# Patient Record
Sex: MALE | Race: OTHER | NOT HISPANIC OR LATINO
[De-identification: names, ages, dates, MRNs, and addresses within clinical notes are randomized per-mention and may not be internally consistent; named-entity substitution may affect disease eponyms.]

---

## 2021-08-06 ENCOUNTER — RESULT REVIEW (OUTPATIENT)
Age: 86
End: 2021-08-06

## 2021-08-06 ENCOUNTER — OUTPATIENT (OUTPATIENT)
Dept: OUTPATIENT SERVICES | Facility: HOSPITAL | Age: 86
LOS: 1 days | End: 2021-08-06
Payer: MEDICARE

## 2021-08-06 VITALS
WEIGHT: 210.1 LBS | RESPIRATION RATE: 18 BRPM | DIASTOLIC BLOOD PRESSURE: 55 MMHG | SYSTOLIC BLOOD PRESSURE: 155 MMHG | HEART RATE: 73 BPM | TEMPERATURE: 98 F | OXYGEN SATURATION: 97 % | HEIGHT: 66 IN

## 2021-08-06 VITALS
HEART RATE: 69 BPM | SYSTOLIC BLOOD PRESSURE: 106 MMHG | OXYGEN SATURATION: 94 % | DIASTOLIC BLOOD PRESSURE: 61 MMHG | RESPIRATION RATE: 15 BRPM

## 2021-08-06 DIAGNOSIS — R91.8 OTHER NONSPECIFIC ABNORMAL FINDING OF LUNG FIELD: ICD-10-CM

## 2021-08-06 PROBLEM — Z00.00 ENCOUNTER FOR PREVENTIVE HEALTH EXAMINATION: Status: ACTIVE | Noted: 2021-08-06

## 2021-08-06 PROCEDURE — 87184 SC STD DISK METHOD PER PLATE: CPT | Mod: XU

## 2021-08-06 PROCEDURE — 88305 TISSUE EXAM BY PATHOLOGIST: CPT | Mod: 26

## 2021-08-06 PROCEDURE — 87116 MYCOBACTERIA CULTURE: CPT

## 2021-08-06 PROCEDURE — 88305 TISSUE EXAM BY PATHOLOGIST: CPT

## 2021-08-06 PROCEDURE — 87077 CULTURE AEROBIC IDENTIFY: CPT

## 2021-08-06 PROCEDURE — 87102 FUNGUS ISOLATION CULTURE: CPT

## 2021-08-06 PROCEDURE — 87206 SMEAR FLUORESCENT/ACID STAI: CPT

## 2021-08-06 PROCEDURE — 88112 CYTOPATH CELL ENHANCE TECH: CPT | Mod: 26

## 2021-08-06 PROCEDURE — 87181 SC STD AGAR DILUTION PER AGT: CPT

## 2021-08-06 PROCEDURE — 88112 CYTOPATH CELL ENHANCE TECH: CPT

## 2021-08-06 PROCEDURE — 31623 DX BRONCHOSCOPE/BRUSH: CPT

## 2021-08-06 PROCEDURE — 87015 SPECIMEN INFECT AGNT CONCNTJ: CPT

## 2021-08-06 PROCEDURE — 31624 DX BRONCHOSCOPE/LAVAGE: CPT

## 2021-08-06 PROCEDURE — 87070 CULTURE OTHR SPECIMN AEROBIC: CPT

## 2021-08-06 RX ORDER — TAMSULOSIN HYDROCHLORIDE 0.4 MG/1
1 CAPSULE ORAL
Qty: 0 | Refills: 0 | DISCHARGE

## 2021-08-06 RX ORDER — FUROSEMIDE 40 MG
1 TABLET ORAL
Qty: 0 | Refills: 0 | DISCHARGE

## 2021-08-06 RX ORDER — POTASSIUM CHLORIDE 20 MEQ
1 PACKET (EA) ORAL
Qty: 0 | Refills: 0 | DISCHARGE

## 2021-08-06 RX ORDER — SODIUM CHLORIDE 9 MG/ML
500 INJECTION INTRAMUSCULAR; INTRAVENOUS; SUBCUTANEOUS
Refills: 0 | Status: DISCONTINUED | OUTPATIENT
Start: 2021-08-06 | End: 2021-08-20

## 2021-08-06 RX ORDER — FLUTICASONE FUROATE, UMECLIDINIUM BROMIDE AND VILANTEROL TRIFENATATE 200; 62.5; 25 UG/1; UG/1; UG/1
1 POWDER RESPIRATORY (INHALATION)
Qty: 0 | Refills: 0 | DISCHARGE

## 2021-08-06 RX ORDER — PANTOPRAZOLE SODIUM 20 MG/1
1 TABLET, DELAYED RELEASE ORAL
Qty: 0 | Refills: 0 | DISCHARGE

## 2021-08-06 RX ORDER — ESCITALOPRAM OXALATE 10 MG/1
1 TABLET, FILM COATED ORAL
Qty: 0 | Refills: 0 | DISCHARGE

## 2021-08-06 RX ORDER — PITAVASTATIN CALCIUM 1.04 MG/1
1 TABLET, FILM COATED ORAL
Qty: 0 | Refills: 0 | DISCHARGE

## 2021-08-06 RX ORDER — VALSARTAN 80 MG/1
1 TABLET ORAL
Qty: 0 | Refills: 0 | DISCHARGE

## 2021-08-06 RX ORDER — CARVEDILOL PHOSPHATE 80 MG/1
1 CAPSULE, EXTENDED RELEASE ORAL
Qty: 0 | Refills: 0 | DISCHARGE

## 2021-08-06 RX ORDER — LEVOTHYROXINE SODIUM 125 MCG
1 TABLET ORAL
Qty: 0 | Refills: 0 | DISCHARGE

## 2021-08-06 RX ORDER — CLOPIDOGREL BISULFATE 75 MG/1
1 TABLET, FILM COATED ORAL
Qty: 0 | Refills: 0 | DISCHARGE

## 2021-08-06 RX ORDER — RIVAROXABAN 15 MG-20MG
1 KIT ORAL
Qty: 0 | Refills: 0 | DISCHARGE

## 2021-08-06 RX ADMIN — SODIUM CHLORIDE 30 MILLILITER(S): 9 INJECTION INTRAMUSCULAR; INTRAVENOUS; SUBCUTANEOUS at 10:26

## 2021-08-06 NOTE — PRE PROCEDURE NOTE - PRE PROCEDURE EVALUATION
Attending Physician: Kennedy Peraza                           Procedure: Bronchoscopy    Indication for Procedure: Hemoptysis - r/o endobronchial lesion  ________________________________________________________  PAST MEDICAL & SURGICAL HISTORY:  MI (myocardial infarction)    Sleep apnea    CHF (congestive heart failure)    BPH (benign prostatic hyperplasia)    Hypothyroid    Atrial fibrillation    Aortic valve replaced      ALLERGIES:  penicillin (Anaphylaxis)  sulfamethoxazole (Anaphylaxis)    HOME MEDICATIONS:  carvedilol 6.25 mg oral tablet: 1 tab(s) orally 2 times a day  clopidogrel 75 mg oral tablet: 1 tab(s) orally once a day  escitalopram 20 mg oral tablet: 1 tab(s) orally once a day  Flomax 0.4 mg oral capsule: 1 cap(s) orally once a day  furosemide 40 mg oral tablet: 1 tab(s) orally once a day  levothyroxine 75 mcg (0.075 mg) oral tablet: 1 tab(s) orally once a day  Livalo 4 mg oral tablet: 1 tab(s) orally once a day  potassium chloride 10 mEq oral capsule, extended release: 1 cap(s) orally 2 times a day  Protonix 40 mg oral delayed release tablet: 1 tab(s) orally once a day  Trelegy Ellipta 100 mcg-62.5 mcg-25 mcg/inh inhalation powder: 1 puff(s) inhaled once a day  valsartan 40 mg oral tablet: 1 tab(s) orally 2 times a day  Xarelto 10 mg oral tablet: 1 tab(s) orally once a day    AICD/PPM: [x] yes   [ ] no    PERTINENT LAB DATA:    see attached office note    PHYSICAL EXAMINATION:    Height (cm): 167.6  Weight (kg): 95.3  BMI (kg/m2): 33.9  BSA (m2): 2.04T(C): 36.4  HR: 73  BP: 155/55  RR: 18  SpO2: 97%    Constitutional: NAD    Neck:  No JVD  Respiratory: CTAB/L  Cardiovascular: S1 and S2  Gastrointestinal: BS+, soft, NT/ND  Extremities: No peripheral edema  Neurological: A/O x 3, no focal deficits    COMMENTS:    The patient is a suitable candidate for the planned procedure.

## 2021-08-06 NOTE — PRE-ANESTHESIA EVALUATION ADULT - NSANTHAIRWAYFT_ENT_ALL_CORE
FROM  Micrognathia  Short/thick neck  Upper/lower complete dentures FROM  Micrognathia  Short/thick neck  Upper/lower complete dentures  Large tongue

## 2021-08-06 NOTE — ASU PATIENT PROFILE, ADULT - PMH
Aortic valve replaced    Atrial fibrillation    BPH (benign prostatic hyperplasia)    CHF (congestive heart failure)    Hypothyroid    MI (myocardial infarction)    Sleep apnea

## 2021-08-06 NOTE — PRE-ANESTHESIA EVALUATION ADULT - NSRADCARDRESULTSFT_GEN_ALL_CORE
EKG 8/3/21: NSR, RBBB, LAFB    AICD interrogation 6/17/21: DDD, battery life 7.1 years, [employer]    TTE 3/18/21: EF 46%, mild LAD, mildly hypertrophied interventricular septum, LV global motion mildly hypokinetic, RV normal size and motion, Dual chamber pacemaker in the right heart, bioprosthetic Aortic valve, mild AR, biosprosthetic mitral valve, trace MR, mild TR, no pulmonary HTN, no pericardial effusion.

## 2021-08-06 NOTE — PRE-ANESTHESIA EVALUATION ADULT - NSANTHPMHFT_GEN_ALL_CORE
CAD  MI x 3: PCI with stents- about 5-6 stents, placed around 3 years ago  Afib  Cardiomyopathy  CHF: EF 40%  COPD  CASSANDRA: CPAP  BPH  Hypothyroidism    PSH: AVR (2011)  MVR  PCI with stents: 1998, 2011  AICD

## 2021-08-06 NOTE — PROGRESS NOTE ADULT - SUBJECTIVE AND OBJECTIVE BOX
Anesthesiology Event Note:    When consenting the patient for administration of Anesthesia, it was explained to him the risks and adverse events that were possible including a sore throat and nausea/vomiting. It was also explained to him that with his history of multiple previous MI's, CAD with stents, and atrial fibrillation, CASSANDRA, and his previous smoking history that he was at an increased risk of MI and CVA from this procedure. It was also explained to him that in general, he was at an increased risk of MI and CVA. The patient seemed surprised by this and stated that he had never been told this by any of his previous physicians. It was explained to him that with his multiple prior surgeries and his risk factors, that his physicians and care team should have at least told him of the increased risk he suffered of developing an MI or CVA.

## 2021-08-07 LAB
GRAM STN FLD: SIGNIFICANT CHANGE UP
NIGHT BLUE STAIN TISS: SIGNIFICANT CHANGE UP
SPECIMEN SOURCE: SIGNIFICANT CHANGE UP
SPECIMEN SOURCE: SIGNIFICANT CHANGE UP

## 2021-08-09 LAB
-  CEFTRIAXONE: SIGNIFICANT CHANGE UP
-  CLINDAMYCIN: SIGNIFICANT CHANGE UP
-  ERYTHROMYCIN: SIGNIFICANT CHANGE UP
-  LEVOFLOXACIN: SIGNIFICANT CHANGE UP
-  PENICILLIN: SIGNIFICANT CHANGE UP
-  TRIMETHOPRIM/SULFAMETHOXAZOLE: SIGNIFICANT CHANGE UP
-  VANCOMYCIN: SIGNIFICANT CHANGE UP
CULTURE RESULTS: SIGNIFICANT CHANGE UP
METHOD TYPE: SIGNIFICANT CHANGE UP
METHOD TYPE: SIGNIFICANT CHANGE UP
ORGANISM # SPEC MICROSCOPIC CNT: SIGNIFICANT CHANGE UP
SPECIMEN SOURCE: SIGNIFICANT CHANGE UP

## 2021-09-04 LAB
CULTURE RESULTS: SIGNIFICANT CHANGE UP
SPECIMEN SOURCE: SIGNIFICANT CHANGE UP

## 2021-09-25 LAB
CULTURE RESULTS: SIGNIFICANT CHANGE UP
SPECIMEN SOURCE: SIGNIFICANT CHANGE UP
